# Patient Record
Sex: MALE | URBAN - METROPOLITAN AREA
[De-identification: names, ages, dates, MRNs, and addresses within clinical notes are randomized per-mention and may not be internally consistent; named-entity substitution may affect disease eponyms.]

---

## 2023-01-25 ENCOUNTER — OFFICE VISIT (OUTPATIENT)
Dept: URBAN - METROPOLITAN AREA SURGERY CENTER 15 | Facility: SURGERY CENTER | Age: 48
End: 2023-01-25

## 2025-07-09 ENCOUNTER — DASHBOARD ENCOUNTERS (OUTPATIENT)
Age: 50
End: 2025-07-09

## 2025-07-09 ENCOUNTER — LAB OUTSIDE AN ENCOUNTER (OUTPATIENT)
Dept: URBAN - METROPOLITAN AREA CLINIC 35 | Facility: CLINIC | Age: 50
End: 2025-07-09

## 2025-07-09 ENCOUNTER — OFFICE VISIT (OUTPATIENT)
Dept: URBAN - METROPOLITAN AREA CLINIC 35 | Facility: CLINIC | Age: 50
End: 2025-07-09
Payer: COMMERCIAL

## 2025-07-09 DIAGNOSIS — D56.0 ALPHA THALASSEMIA: ICD-10-CM

## 2025-07-09 DIAGNOSIS — Z12.11 ENCOUNTER FOR SCREENING FOR MALIGNANT NEOPLASM OF COLON: ICD-10-CM

## 2025-07-09 DIAGNOSIS — D57.3 SICKLE CELL TRAIT: ICD-10-CM

## 2025-07-09 PROBLEM — 305058001: Status: ACTIVE | Noted: 2025-07-09

## 2025-07-09 PROBLEM — 16402000: Status: ACTIVE | Noted: 2025-07-09

## 2025-07-09 PROBLEM — 68913001: Status: ACTIVE | Noted: 2025-07-09

## 2025-07-09 PROCEDURE — 99204 OFFICE O/P NEW MOD 45 MIN: CPT | Performed by: INTERNAL MEDICINE

## 2025-07-09 RX ORDER — SODIUM, POTASSIUM,MAG SULFATES 17.5-3.13G
AS DIRECTED SOLUTION, RECONSTITUTED, ORAL ORAL
Qty: 1 | Refills: 0 | OUTPATIENT
Start: 2025-07-09 | End: 2025-07-11

## 2025-07-09 RX ORDER — ROSUVASTATIN CALCIUM 10 MG/1
1 TABLET TABLET ORAL ONCE A DAY
Qty: 90 TABLET | Status: ACTIVE | COMMUNITY

## 2025-07-09 NOTE — HPI-ANEMIA
Patient was first diagnosed by his PCP at [facility] at the time in 2019, and the most recent labs were taken 07/01/2025. Labs from 07/01/2025 note that hgb pattern is consistent with sickle cell trait (hgb AS) with possible Alpha Thalassemia if THERESA and other low MCV & MCH etiologies are ruled out. He currently states that he started back on iron supplements on Friday 7/4 (65mg daily) and that he has not had iron infusion. Denies donating blood. Denies NSAID use, a history of GI bleeding, any blood or melena in stools, epigastric/abdominal pains, fatigue, cold extremities, light headedness, dizziness. States that he has not had an EGD before.  PCP LABS  (07/01/2025) Hemoglobinopathy Evaluation: Hgb - 12.2 L, MCV - 70.3 L, MCH - 21.3 L, RDW - 16.9 H, Hgb A - 70.7 L, Hgb S - 26.1 H, All other values WNL CBC w/DIFF/PLT: Hgb - 12.2 L, MCV - 70.3 L, MCH - 21.3 L, MCHC - 30.3 L, RDW - 16.9 H, All other values WNL CMP: Glucose - 105 H, All other values WNL Lipid Panel: Chol - 221 H, LDL - 141 H, Non HDL - 164 H, All other values WNL Iron & TIBC & Ferritin - WNL  (01/07/2025) CBC w/DIFF/PLT: RBC - 6.07 H, Hgb - 13.1 L, MCV - 70.5. L, MCH - 21.6 L, MCHC - 30.6 L, RDW - 17.2 H, All other values WNL CMP: Glucose - 104 H, All other values WNL Lipid Panel: Chol - 244 H, Triglycerides - 184 H, LDL - 169 H, CHOL/HDLC - 6.0 H, Non HDL - 203 H, All other values WNL Iron & TIBC - WNL Ferritin - 559 H  (07/02/2024) CBC w/DIFF/PTL: Hgb - 12.4 L, MCV - 68.9 L, MCH - 21.9 L, MCHC - 31. 8 L, RDW - 16.2 H, All other values WNL CMP: Glucose - 103 H, Creatinine - 1.32 H, All other values WNL Lipid Panel: Chol - 212 H, Triglycerides - 219 H, LDL - 125 H, Non HDL - 159 H, All other values WNL Iron & TIBC - WNL Ferritin - 398 H

## 2025-07-09 NOTE — HPI-COLORECTAL CANCER SCREENING
50 year old male patient presents for colorectal cancer screening consult. He is here due to age. Admits this will be first colonoscopy. Denies family hx of colon cancer. He denies family hx of colon polyps. Admits normal bowel habits at this time, with 1-2 bowel movements a day. Denies any current symptoms of rectal bleeding, melena or mucus.

## 2025-07-24 ENCOUNTER — OFFICE VISIT (OUTPATIENT)
Dept: URBAN - METROPOLITAN AREA SURGERY CENTER 8 | Facility: SURGERY CENTER | Age: 50
End: 2025-07-24
Payer: COMMERCIAL

## 2025-07-24 ENCOUNTER — CLAIMS CREATED FROM THE CLAIM WINDOW (OUTPATIENT)
Dept: URBAN - METROPOLITAN AREA SURGERY CENTER 8 | Facility: SURGERY CENTER | Age: 50
End: 2025-07-24

## 2025-07-24 ENCOUNTER — CLAIMS CREATED FROM THE CLAIM WINDOW (OUTPATIENT)
Dept: URBAN - METROPOLITAN AREA CLINIC 4 | Facility: CLINIC | Age: 50
End: 2025-07-24
Payer: COMMERCIAL

## 2025-07-24 DIAGNOSIS — K62.1 RECTAL POLYP: ICD-10-CM

## 2025-07-24 DIAGNOSIS — K62.1 POLYP OF RECTUM: ICD-10-CM

## 2025-07-24 DIAGNOSIS — Z12.11 COLON CANCER SCREENING: ICD-10-CM

## 2025-07-24 PROCEDURE — 45380 COLONOSCOPY AND BIOPSY: CPT | Performed by: INTERNAL MEDICINE

## 2025-07-24 PROCEDURE — 88305 TISSUE EXAM BY PATHOLOGIST: CPT | Performed by: PATHOLOGY

## 2025-07-24 RX ORDER — ROSUVASTATIN CALCIUM 10 MG/1
1 TABLET TABLET ORAL ONCE A DAY
Qty: 90 TABLET | Status: ACTIVE | COMMUNITY

## 2025-07-25 ENCOUNTER — TELEPHONE ENCOUNTER (OUTPATIENT)
Dept: URBAN - METROPOLITAN AREA CLINIC 35 | Facility: CLINIC | Age: 50
End: 2025-07-25

## 2025-08-08 ENCOUNTER — OFFICE VISIT (OUTPATIENT)
Dept: URBAN - METROPOLITAN AREA CLINIC 35 | Facility: CLINIC | Age: 50
End: 2025-08-08